# Patient Record
Sex: MALE | Race: WHITE | NOT HISPANIC OR LATINO | URBAN - NONMETROPOLITAN AREA
[De-identification: names, ages, dates, MRNs, and addresses within clinical notes are randomized per-mention and may not be internally consistent; named-entity substitution may affect disease eponyms.]

---

## 2024-09-10 ENCOUNTER — APPOINTMENT (OUTPATIENT)
Dept: PRIMARY CARE | Facility: CLINIC | Age: 39
End: 2024-09-10
Payer: COMMERCIAL

## 2024-09-10 VITALS
DIASTOLIC BLOOD PRESSURE: 64 MMHG | HEIGHT: 69 IN | OXYGEN SATURATION: 96 % | BODY MASS INDEX: 30.21 KG/M2 | WEIGHT: 204 LBS | SYSTOLIC BLOOD PRESSURE: 126 MMHG | HEART RATE: 91 BPM

## 2024-09-10 DIAGNOSIS — R53.83 FATIGUE, UNSPECIFIED TYPE: ICD-10-CM

## 2024-09-10 DIAGNOSIS — E78.00 HYPERCHOLESTEROLEMIA: ICD-10-CM

## 2024-09-10 DIAGNOSIS — Z00.00 WELL ADULT EXAM: Primary | ICD-10-CM

## 2024-09-10 DIAGNOSIS — R73.9 HYPERGLYCEMIA: ICD-10-CM

## 2024-09-10 PROBLEM — G89.29 CHRONIC LOW BACK PAIN: Status: RESOLVED | Noted: 2020-04-17 | Resolved: 2024-09-10

## 2024-09-10 PROBLEM — M54.50 CHRONIC LOW BACK PAIN: Status: RESOLVED | Noted: 2020-04-17 | Resolved: 2024-09-10

## 2024-09-10 PROBLEM — R10.11 RUQ ABDOMINAL PAIN: Status: RESOLVED | Noted: 2024-09-10 | Resolved: 2024-09-10

## 2024-09-10 PROBLEM — K76.0 HEPATIC STEATOSIS: Status: ACTIVE | Noted: 2024-09-10

## 2024-09-10 PROBLEM — R74.01 ELEVATED TRANSAMINASE LEVEL: Status: ACTIVE | Noted: 2024-09-10

## 2024-09-10 PROBLEM — M19.90 ARTHRITIS: Status: RESOLVED | Noted: 2020-04-17 | Resolved: 2024-09-10

## 2024-09-10 PROBLEM — J11.1 INFLUENZA: Status: RESOLVED | Noted: 2024-09-10 | Resolved: 2024-09-10

## 2024-09-10 PROCEDURE — 3008F BODY MASS INDEX DOCD: CPT | Performed by: FAMILY MEDICINE

## 2024-09-10 PROCEDURE — 99396 PREV VISIT EST AGE 40-64: CPT | Performed by: FAMILY MEDICINE

## 2024-09-10 PROCEDURE — 4004F PT TOBACCO SCREEN RCVD TLK: CPT | Performed by: FAMILY MEDICINE

## 2024-09-10 RX ORDER — ASPIRIN 325 MG
50 TABLET, DELAYED RELEASE (ENTERIC COATED) ORAL DAILY
Qty: 30 CAPSULE | Refills: 11 | Status: SHIPPED | OUTPATIENT
Start: 2024-09-10 | End: 2025-09-10

## 2024-09-10 RX ORDER — ROSUVASTATIN CALCIUM 10 MG/1
10 TABLET, COATED ORAL DAILY
Qty: 100 TABLET | Refills: 3 | Status: SHIPPED | OUTPATIENT
Start: 2024-09-10 | End: 2025-10-15

## 2024-09-10 ASSESSMENT — ENCOUNTER SYMPTOMS
ADENOPATHY: 0
SORE THROAT: 0
CHEST TIGHTNESS: 0
PALPITATIONS: 0
FEVER: 0
NUMBNESS: 1
COLOR CHANGE: 0
ABDOMINAL PAIN: 0
ARTHRALGIAS: 0
DYSPHORIC MOOD: 0
TROUBLE SWALLOWING: 0
SHORTNESS OF BREATH: 1
NAUSEA: 0
WEAKNESS: 0
COUGH: 0
BACK PAIN: 0
BLOOD IN STOOL: 0
POLYDIPSIA: 0
HEADACHES: 0
NERVOUS/ANXIOUS: 0
DYSURIA: 0
FREQUENCY: 0
BRUISES/BLEEDS EASILY: 0
CHILLS: 0
CONSTIPATION: 0
EYE REDNESS: 0
EYE PAIN: 0
TREMORS: 0
DIARRHEA: 0
VOMITING: 0
HEMATURIA: 0
FATIGUE: 0
DIZZINESS: 0
WHEEZING: 0
POLYPHAGIA: 0

## 2024-09-10 NOTE — PROGRESS NOTES
Subjective   Patient ID: Moise Strauss is a 38 y.o. male who presents for Annual Exam (Check up ).  HPI  Does not take any medicines    Left arm will go tingling and numb- goes away when moves out of certain position- last a few minutes  No CP, palpitations, weakness, dizziness, HA, vision changes  Some SOB when smokes    Ophtho- 8/24  Dentist- been awhile  Colonoscopy-  PAUL-  FOBT-  PSA-  UA/Micro-  Lung CT-  Coronary Calcium CT Score-  AAA-  EKG-  RSV  Prevnar-  Flu- refused  Shingrix-  Td- 5/7/17  Hep C-  Advance Directives-          Current Outpatient Medications:     co-enzyme Q-10 50 mg capsule, Take 1 capsule (50 mg) by mouth once daily., Disp: 30 capsule, Rfl: 11    rosuvastatin (Crestor) 10 mg tablet, Take 1 tablet (10 mg) by mouth once daily., Disp: 100 tablet, Rfl: 3   History reviewed. No pertinent surgical history.   Past Medical History:   Diagnosis Date    Arthritis 04/17/2020    Influenza 09/10/2024    RUQ abdominal pain 09/10/2024     Social History     Tobacco Use    Smoking status: Every Day     Current packs/day: 1.00     Types: Cigarettes    Smokeless tobacco: Never   Substance Use Topics    Alcohol use: Yes     Alcohol/week: 48.0 standard drinks of alcohol     Types: 24 Cans of beer, 24 Shots of liquor per week    Drug use: Never      Family History   Problem Relation Name Age of Onset    Summers's disease Mother        Review of Systems   Constitutional:  Negative for chills, fatigue and fever.   HENT:  Negative for congestion, ear discharge, ear pain, hearing loss, nosebleeds, sore throat, tinnitus and trouble swallowing.    Eyes:  Negative for pain, redness and visual disturbance.   Respiratory:  Positive for shortness of breath. Negative for cough, chest tightness and wheezing.    Cardiovascular:  Negative for chest pain, palpitations and leg swelling.   Gastrointestinal:  Negative for abdominal pain, blood in stool, constipation, diarrhea, nausea and vomiting.   Endocrine: Negative for  "cold intolerance, heat intolerance, polydipsia, polyphagia and polyuria.   Genitourinary:  Negative for dysuria, frequency, hematuria and urgency.   Musculoskeletal:  Negative for arthralgias, back pain and gait problem.   Skin:  Negative for color change and rash.   Neurological:  Positive for numbness. Negative for dizziness, tremors, syncope, weakness and headaches.   Hematological:  Negative for adenopathy. Does not bruise/bleed easily.   Psychiatric/Behavioral:  Negative for dysphoric mood. The patient is not nervous/anxious.        Objective   /64   Pulse 91   Ht 1.753 m (5' 9\")   Wt 92.5 kg (204 lb)   SpO2 96%   BMI 30.13 kg/m²    Physical Exam  Vitals and nursing note reviewed.   Constitutional:       General: He is not in acute distress.     Appearance: Normal appearance.   HENT:      Head: Normocephalic and atraumatic.      Right Ear: Tympanic membrane, ear canal and external ear normal.      Left Ear: Tympanic membrane, ear canal and external ear normal.      Nose: Nose normal.      Mouth/Throat:      Mouth: Mucous membranes are moist.      Pharynx: Oropharynx is clear.   Eyes:      Extraocular Movements: Extraocular movements intact.      Conjunctiva/sclera: Conjunctivae normal.      Pupils: Pupils are equal, round, and reactive to light.   Neck:      Vascular: No carotid bruit.   Cardiovascular:      Rate and Rhythm: Normal rate and regular rhythm.      Pulses: Normal pulses.      Heart sounds: Normal heart sounds. No murmur heard.  Pulmonary:      Effort: Pulmonary effort is normal.      Breath sounds: Normal breath sounds. No wheezing, rhonchi or rales.   Abdominal:      General: Abdomen is flat. Bowel sounds are normal.      Palpations: Abdomen is soft. There is no mass.   Musculoskeletal:         General: Normal range of motion.      Cervical back: Normal range of motion and neck supple.   Lymphadenopathy:      Cervical: No cervical adenopathy.   Skin:     Capillary Refill: Capillary " refill takes less than 2 seconds.   Neurological:      General: No focal deficit present.      Mental Status: He is alert and oriented to person, place, and time.      Cranial Nerves: No cranial nerve deficit.      Motor: No weakness.      Deep Tendon Reflexes: Reflexes normal.      Comments: + tinel's over left cubital tunnel  Negative spurling's in neck   Psychiatric:         Mood and Affect: Mood normal.         Behavior: Behavior normal.         Assessment/Plan   Problem List Items Addressed This Visit       Hyperglycemia    Relevant Orders    Glucose    Hypercholesterolemia    Relevant Medications    rosuvastatin (Crestor) 10 mg tablet    co-enzyme Q-10 50 mg capsule     Other Visit Diagnoses       Well adult exam    -  Primary    Fatigue, unspecified type        Relevant Orders    Testosterone        Hyperglycemia- limit carbs, alcohol, check FBS in 2 months    Fatigue- check testosterone in 2 months    Hyperlipidemia- crestor, Co-Q10, TLC    Patient understands and agrees with treatment plan    Jesús Peters, DO

## 2024-11-12 ENCOUNTER — APPOINTMENT (OUTPATIENT)
Dept: PRIMARY CARE | Facility: CLINIC | Age: 39
End: 2024-11-12
Payer: COMMERCIAL

## 2024-11-12 DIAGNOSIS — R53.83 FATIGUE, UNSPECIFIED TYPE: ICD-10-CM

## 2024-11-12 DIAGNOSIS — R73.9 HYPERGLYCEMIA: ICD-10-CM

## 2024-11-12 LAB — GLUCOSE SERPL-MCNC: 85 MG/DL (ref 74–99)

## 2024-11-12 PROCEDURE — 82947 ASSAY GLUCOSE BLOOD QUANT: CPT

## 2024-11-12 PROCEDURE — 84403 ASSAY OF TOTAL TESTOSTERONE: CPT

## 2024-11-13 LAB — TESTOST SERPL-MCNC: 392 NG/DL (ref 240–1000)
